# Patient Record
Sex: FEMALE | Race: WHITE | NOT HISPANIC OR LATINO | Employment: UNEMPLOYED | URBAN - METROPOLITAN AREA
[De-identification: names, ages, dates, MRNs, and addresses within clinical notes are randomized per-mention and may not be internally consistent; named-entity substitution may affect disease eponyms.]

---

## 2017-09-20 ENCOUNTER — OFFICE VISIT (OUTPATIENT)
Dept: HEMATOLOGY/ONCOLOGY | Facility: CLINIC | Age: 82
End: 2017-09-20
Payer: MEDICARE

## 2017-09-20 VITALS
RESPIRATION RATE: 18 BRPM | DIASTOLIC BLOOD PRESSURE: 60 MMHG | HEART RATE: 76 BPM | SYSTOLIC BLOOD PRESSURE: 148 MMHG | TEMPERATURE: 98 F

## 2017-09-20 DIAGNOSIS — D63.8 ANEMIA OF OTHER CHRONIC DISEASE: ICD-10-CM

## 2017-09-20 DIAGNOSIS — E07.9 THYROID DISEASE: ICD-10-CM

## 2017-09-20 DIAGNOSIS — Z86.39 HISTORY OF ADDISON'S DISEASE: ICD-10-CM

## 2017-09-20 DIAGNOSIS — D50.0 IRON DEFICIENCY ANEMIA DUE TO CHRONIC BLOOD LOSS: ICD-10-CM

## 2017-09-20 PROCEDURE — 99214 OFFICE O/P EST MOD 30 MIN: CPT | Mod: ,,, | Performed by: INTERNAL MEDICINE

## 2017-09-20 PROCEDURE — 1159F MED LIST DOCD IN RCRD: CPT | Mod: ,,, | Performed by: INTERNAL MEDICINE

## 2017-09-20 RX ORDER — LORAZEPAM 1 MG/1
1 TABLET ORAL NIGHTLY
COMMUNITY

## 2017-09-20 RX ORDER — LEVOTHYROXINE SODIUM 150 UG/1
125 TABLET ORAL
COMMUNITY

## 2017-09-20 RX ORDER — CARVEDILOL 12.5 MG/1
25 TABLET ORAL
COMMUNITY
Start: 2011-11-17 | End: 2018-05-29

## 2017-09-20 RX ORDER — ACETAMINOPHEN 500 MG
TABLET ORAL
COMMUNITY
End: 2018-05-29

## 2017-09-20 RX ORDER — DIPHENHYDRAMINE HCL 25 MG
50 TABLET ORAL NIGHTLY PRN
COMMUNITY

## 2017-09-20 RX ORDER — LOPERAMIDE HCL 1MG/7.5ML
LIQUID (ML) ORAL
COMMUNITY
End: 2018-05-29

## 2017-09-20 RX ORDER — LISINOPRIL 20 MG/1
20 TABLET ORAL
COMMUNITY
Start: 2011-11-17 | End: 2018-05-29

## 2017-09-20 RX ORDER — SOTALOL HYDROCHLORIDE 80 MG/1
80 TABLET ORAL
COMMUNITY
End: 2018-05-29

## 2017-09-20 RX ORDER — PANTOPRAZOLE SODIUM 40 MG/1
40 TABLET, DELAYED RELEASE ORAL
COMMUNITY
End: 2018-05-29

## 2017-09-20 RX ORDER — METOPROLOL TARTRATE 100 MG/1
50 TABLET ORAL
COMMUNITY
Start: 2011-10-28 | End: 2018-05-29

## 2017-09-20 RX ORDER — FLUDROCORTISONE ACETATE 0.1 MG/1
0.1 TABLET ORAL DAILY
COMMUNITY

## 2017-09-20 RX ORDER — DIVALPROEX SODIUM 125 MG/1
250 TABLET, DELAYED RELEASE ORAL DAILY
COMMUNITY

## 2017-09-20 RX ORDER — ACETAMINOPHEN 500 MG
500 TABLET ORAL DAILY PRN
COMMUNITY

## 2017-09-20 RX ORDER — POTASSIUM CHLORIDE 750 MG/1
10 TABLET, EXTENDED RELEASE ORAL
COMMUNITY
End: 2018-05-29

## 2017-09-24 PROBLEM — E07.9 THYROID DISEASE: Status: ACTIVE | Noted: 2017-09-24

## 2017-09-24 PROBLEM — D50.0 IRON DEFICIENCY ANEMIA DUE TO CHRONIC BLOOD LOSS: Status: ACTIVE | Noted: 2017-09-24

## 2017-09-24 PROBLEM — Z86.39 HISTORY OF ADDISON'S DISEASE: Status: ACTIVE | Noted: 2017-09-24

## 2017-09-24 PROBLEM — D63.8 ANEMIA OF OTHER CHRONIC DISEASE: Status: ACTIVE | Noted: 2017-09-24

## 2017-09-25 NOTE — PROGRESS NOTES
[facility] History & Physical    Subjective:      Patient ID:   Sydney Bolton  88 y.o. female  1928  [place]      Chief Complaint:   Anemia follow up    HPI:  88 y.o. 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  Pacemaker?    Hx HTN, DM, GERD, renal insuff, cholesterol, thyroid dx, CVA, TIA, A fib, and restless leg sx.  R breast bx, benign, bladder and uterine prolapse.    Hx T & A, appendectomy, thyroid dx- [id number].  Pacemaker x's 3, 1998.    M0    Allergy Quinine, narcotics, rubarb.  Smoke no, ETOH no, Job  x's 20 years.    Mom colon cancer. Dad aneurism, Sister breast/lung cancer.  Sister lung cancer, kids DM x's 2.    Sister MTHFR, increased plt aggr., F5L +, increased homocystene    ROS:   GEN: normal without any fever, night sweats or weight loss  HEENT: See HPI.   no HA's, sore throat, stiff neck, changes in vision  CV: See HPI.  no CP, SOB, PND, TURNER or orthopnea  PULM: normal with no SOB, cough, hemoptysis, sputum or pleuritic pain  GI: See HPI.  no abdominal pain, nausea, vomiting, constipation, diarrhea, melanotic stools, BRBPR, or hematemesis  : normal with no hematuria, dysuria  BREAST: normal with no mass, discharge, pain  SKIN: normal with no rash, erythema, bruising, or swelling     Past Medical History:   Diagnosis Date    Anemia     Clotting disorder     Thyroid disease      No past surgical history on file.    Review of patient's allergies indicates:   Allergen Reactions    Quinine Anaphylaxis    Levofloxacin      Social History     Social History    Marital status:      Spouse name: N/A    Number of children: N/A    Years of education: N/A     Occupational History    Not on file.     Social History Main Topics    Smoking status: Never Smoker    Smokeless tobacco: Never Used    Alcohol use No    Drug use: Unknown    Sexual activity: Not on file      Other Topics Concern    Not on file     Social History Narrative    No narrative on file         Current Outpatient Prescriptions:     acetaminophen (TYLENOL) 500 MG tablet, Take 500 mg by mouth., Disp: , Rfl:     carvedilol (COREG) 12.5 MG tablet, Take 25 mg by mouth., Disp: , Rfl:     diphenhydrAMINE (SOMINEX) 25 mg tablet, Take 25 mg by mouth., Disp: , Rfl:     iron, carbonyl, (ICAR) 15 mg/1.25 mL Susp, Take by mouth., Disp: , Rfl:     levothyroxine (SYNTHROID) 150 MCG tablet, Take 150 mcg by mouth., Disp: , Rfl:     pantoprazole (PROTONIX) 40 MG tablet, Take 40 mg by mouth., Disp: , Rfl:     sotalol (BETAPACE) 80 MG tablet, Take 80 mg by mouth., Disp: , Rfl:     divalproex (DEPAKOTE) 125 MG EC tablet, Take 125 mg by mouth., Disp: , Rfl:     fludrocortisone (FLORINEF) 0.1 mg Tab, Take 0.1 mg by mouth., Disp: , Rfl:     lisinopril (PRINIVIL,ZESTRIL) 20 MG tablet, Take 20 mg by mouth., Disp: , Rfl:     loperamide (IMODIUM) 1 mg/7.5 mL Liqd, Take by mouth., Disp: , Rfl:     lorazepam (ATIVAN) 1 MG tablet, Take 1 tablet by mouth., Disp: , Rfl:     melatonin 5 mg Tab, Take by mouth., Disp: , Rfl:     metoprolol tartrate (LOPRESSOR) 100 MG tablet, Take 50 mg by mouth., Disp: , Rfl:     potassium chloride (KLOR-CON) 10 MEQ TbSR, Take 10 mEq by mouth., Disp: , Rfl:           Objective:   Vitals:  Blood pressure (!) 148/60, pulse 76, temperature 98 °F (36.7 °C), resp. rate 18.    Physical Examination:   GEN: no apparent distress, comfortable  HEAD: atraumatic and normocephalic  EYES: no pallor, no icterus  ENT:  no pharyngeal erythema, external ears WNL; no nasal discharge; no thrush  NECK: no masses, thyroid normal, trachea midline, no LAD/LN's, supple  CV: RRR with no murmur; normal pulse; normal S1 and S2; no pedal edema  CHEST: Normal respiratory effort; CTAB; normal breath sounds; no wheeze or crackles  ABDOM: nontender and nondistended; soft; normal bowel sounds; no  rebound/guarding  MUSC/Skeletal: ROM normal; no crepitus; joints normal; no deformities or arthropathy  EXTREM: no clubbing, cyanosis, inflammation or swelling  SKIN: no rashes, lesions, ulcers, petechiae or subcutaneous nodules  : no jacobs  NEURO: grossly intact; motor/sensory WNL; no tremors  PSYCH: normal mood, affect and behavior  BREASTS: no palpable mass  LYMPH: normal cervical, supraclavicular, axillary and groin LN's      Labs:    Hgb 12.8.    Assessment/Plan:     (1) 88 y.o. 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  Better.  Observe for now.       RTC, CBC 6 months.    (2)Austin's Dx hx, thyroid dx hx.

## 2018-07-11 ENCOUNTER — OFFICE VISIT (OUTPATIENT)
Dept: NEUROLOGY | Facility: CLINIC | Age: 83
End: 2018-07-11
Payer: MEDICARE

## 2018-07-11 VITALS
SYSTOLIC BLOOD PRESSURE: 146 MMHG | HEIGHT: 68 IN | HEART RATE: 91 BPM | WEIGHT: 127.63 LBS | DIASTOLIC BLOOD PRESSURE: 64 MMHG | RESPIRATION RATE: 20 BRPM | BODY MASS INDEX: 19.34 KG/M2

## 2018-07-11 DIAGNOSIS — R55 SYNCOPE, UNSPECIFIED SYNCOPE TYPE: Primary | ICD-10-CM

## 2018-07-11 DIAGNOSIS — E07.9 THYROID DISEASE: ICD-10-CM

## 2018-07-11 DIAGNOSIS — Z86.39 HISTORY OF ADDISON'S DISEASE: ICD-10-CM

## 2018-07-11 PROCEDURE — 99999 PR PBB SHADOW E&M-EST. PATIENT-LVL III: CPT | Mod: PBBFAC,,, | Performed by: PSYCHIATRY & NEUROLOGY

## 2018-07-11 PROCEDURE — 99205 OFFICE O/P NEW HI 60 MIN: CPT | Mod: S$GLB,,, | Performed by: PSYCHIATRY & NEUROLOGY

## 2018-07-11 NOTE — LETTER
July 12, 2018      Dionicio [place] MD  [address]  [address]  [facility] [address]           [facility]  [address] [facility] [address] [place] [address]  Phone: [phone]  Fax: [phone]          Patient: Sydney Bolton   MR Number: [id number]   YOB: 1928   Date of Visit: 7/11/2018       Dear Dr. Dionicio [place]:    Thank you for referring Sydney Bolton to me for evaluation. Attached you will find relevant portions of my assessment and plan of care.    If you have questions, please do not hesitate to call me. I look forward to following Sydney Bolton along with you.    Sincerely,    Jane Sosa, DO    Enclosure  CC:  No Recipients    If you would like to receive this communication electronically, please contact [email or web address] or [phone] to request more information on [employer] Link access.    For providers and/or their staff who would like to refer a patient to [facility], please contact us through our one-stop-shop provider referral line, [facility], at [phone].    If you feel you have received this communication in error or would no longer like to receive these types of communications, please e-mail [email or web address]

## 2018-07-11 NOTE — PROGRESS NOTES
NEUROLOGY  Outpatient CONSULT    [facility]  [address] [facility] [address], [place] [address]  [phone] (office) / [phone] (fax)    Patient Name:  Sydney Bolton  :  1928  MR #:  [id number]  Acct #:  [id number]    Date of Neurology Consult: 2018  Name of Neurologist: Jane Sosa D.O, ABPN, AOBNP, ABEM    Other Physicians:  Dionicio [place] MD (Primary Care Physician); Dionicio [place] MD (Referring)      Chief Complaint: Fall (consult) and Dizziness      History of Present Illness (HPI):  Sydney Bolton is a 89 y.o. female referred by her primary physician for consultation regarding syncope.  She is accompanied by her daughter today.    Patient starting passing out a long time ago, but it has become more significant in the past year.  She states she has been hospitalized for falls, but she is not sure what happens.  She states it is like someone just turns the light out. She will fall like a tree per observers.  She will often hit her head with these falls. She has had concussions.  She will drop over right in front of people.  Her first episode of loss of consciousness was in the 3rd grade.  She was nauseated from her appendicitis and she passed out.  She would often pass out in gym class.  After she had her children she would often pass out.  She has had multiple evaluations for this and was told that her heart was beating too fast.  She was diagnosed with tachycardia and was seen by cardiology.  She had multiple procedures to try to curb the speed of her heart and finally after an ablation and pacemaker she was doing much better for a long time.   She has also been diagnosed with atrial fibrillation, which is different than her tachycardia.  More recently, she has started having syncope again.  She was seen by her primary who recently diagnosed her with a concussion.    She is only losing consciousness when standing.  It generally occurs after she has been standing  "for a few minutes or more.  She denies any warning that she is going to pass out.  She states she is just "gone".  She will initially feel confused when she wakes up.  She states she has trouble calling on the phone for help initially.  She will sometimes have trouble remembering where she lives.  She doesn't believe she is unconscious very long.  Her daughter states she is only out for a brief moment.  She is described to be stiff as a board when she falls, her legs don't buckle.  She does not throw up but can feel a little nauseated.  She occasionally will urinate with these, but she has never had bowel incontinence.      She has been told at one point that she had "secondary Schuyler's disease".  She was put on salt tablets and Florinef for this.  These helped initially, but she is no longer on the salt tablets.  She just eats salt and takes the Florinef.    She had a tilt table test.  She had a mild reduction in blood pressure, but was just told to drink more water.  She did not take her medications that morning, so she was off Florinef that day.  She typically only takes her Florinef in the morning.  She had her pacemaker interrogated and adjustments made, but her spells continued.  She is not dizzy with these falls.  She can have dizziness, but she states this is mild.  It is not a spinning feeling, she just feels like she has lost her balance.  She was put on Paxil for depression after her  .  It helped with her will to live, her tremors and her choking while eating, but it did not help her syncope.  She saw a sleep neurologist who put her on Depakote for restless sleep.  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  Prior to Depakote she would have episodes of feeling like all the blood had drained out of her and she would pass out. These got better on Depakote.  She has had her dose of Depakote reduced, but the spells haven't changed.  She had been on propranolol for years in the past.       She " has some neuropathy that was diagnosed a couple of years ago by a foot specialist.  She states she feels like she has an ace bandage around her foot and ankle.  She has had a B12 deficiency in the past.  She is not on B12 currently.  She has problems with UTIs, but no kidney disease.  She follows with an endocrinologist for Brett's.    She had labs done with Dr. [place] at [facility] in [place].      Treatment to date:    Annalisa Yoo  Depakote  Propranolol    Review of Systems:   General: Weight gain: No, Weight Loss: Yes, Fatigue: Yes,   Fever: No, Chills: No, Night Sweats: No, Insomnia: No, Excessive sleeping: No   Respiratory:  Cough: Yes, Shortness of Breath: No,   Wheezing: No, Excessive Snoring: No, Coughing up blood: No  Endocrine: Heat Intolerance: No, Cold Intolerance: Yes,   Excessive Thirst: No, Excessive Hunger: No,   Eyes:  Blurred Vision: No, Double Vision: No,   Light Sensitivity: No, Eye pain: No  Musculoskeletal: Muscle Aches/Pain: No, Joint Pain/Swelling: No, Muscle Cramps: No, Muscle Weakness: No, Neck Pain: No, Back Pain: No   Neurological: Difficulty Walking/Falls: No, Headache Migraine: No, Dizziness/Vertigo: Yes, Fainting: Yes, Difficulty with Speech: No, Weakness: Yes, Tingling/Numbness: Yes, Tremors: No, Memory Problems: No, Seizures: No, Difficulty Swallowing: No, Altered Taste: No.  Cardiovascular: Chest Pain: Yes, Shortness of Breath: No,   Palpitations: No,  Gastrointestinal: Nausea/Vomiting: No, Constipation: No, Diarrhea: No, Bloody Stools: No   Psych/Cog:  Depression: Yes, Anxiety: No, Hallucinations: No, Problems Concentrating: No  : Frequent Urination: No, Incontinence: Yes, Blood of Urine: No, Urinary Infections: No, Changes in Sex Drive: No   ENT:Hearing Loss: No, Earache: No, Ringing in Ears: Yes,   Facial Pain: No, Chronic Congestion: No   Immune: Seasonal Allergies: No, Hives and/or Rashes: No  The remainder of the review of twelve body systems was reviewed and  normal.    Past Medical, Surgical, Family & Social History:   Past Medical History:   Diagnosis Date    Anemia     Clotting disorder     Thyroid disease      Past Surgical History:   Procedure Laterality Date    APPENDECTOMY      CARDIAC PACEMAKER PLACEMENT      left arm surgery       had plate put in due to a break    THYROIDECTOMY      TONSILLECTOMY       Family History   Problem Relation Age of Onset    Colon cancer Mother     Anemia Father     Breast cancer Sister     Lung cancer Sister      Alcohol use:  reports that she does not drink alcohol.   (Of note, 0.6 oz = 1 beer or 6 oz = 10 beers).  Tobacco use:  reports that she has never smoked. She has never used smokeless tobacco.  Street drug use:  reports that she does not use drugs.  Allergies: Quinine; Levofloxacin; and Rhubarb.    Home Medications:     Current Outpatient Prescriptions:     apixaban (ELIQUIS) 5 mg Tab, Take 5 mg by mouth 2 (two) times daily., Disp: , Rfl:     diphenhydrAMINE (SOMINEX) 25 mg tablet, Take 50 mg by mouth nightly as needed for Insomnia. , Disp: , Rfl:     diphenoxylate-atropine 2.5-0.025 mg (LOMOTIL) 2.5-0.025 mg per tablet, Take 1 tablet by mouth 4 (four) times daily as needed for Diarrhea., Disp: , Rfl:     divalproex (DEPAKOTE) 125 MG EC tablet, Take 250 mg by mouth once daily. , Disp: , Rfl:     fludrocortisone (FLORINEF) 0.1 mg Tab, Take 0.1 mg by mouth once daily. , Disp: , Rfl:     levothyroxine (SYNTHROID) 150 MCG tablet, Take 125 mcg by mouth before breakfast. , Disp: , Rfl:     lisinopril (PRINIVIL,ZESTRIL) 5 MG tablet, Take 5 mg by mouth once daily., Disp: , Rfl:     lorazepam (ATIVAN) 1 MG tablet, Take 1 tablet by mouth every evening. , Disp: , Rfl:     omeprazole (PRILOSEC) 20 MG capsule, Take 20 mg by mouth once daily., Disp: , Rfl:     paroxetine (PAXIL) 10 MG tablet, Take 10 mg by mouth every morning., Disp: , Rfl:     promethazine (PHENERGAN) 12.5 MG Tab, Take 12.5 mg by mouth every 6 (six)  "hours as needed (nausea). , Disp: , Rfl:     spironolactone (ALDACTONE) 25 MG tablet, Take 25 mg by mouth once daily., Disp: , Rfl:     acetaminophen (TYLENOL) 500 MG tablet, Take 500 mg by mouth daily as needed for Pain. , Disp: , Rfl:     guaiFENesin (MUCINEX) 600 mg 12 hr tablet, Take 1,200 mg by mouth 2 (two) times daily as needed for Congestion., Disp: , Rfl:     magnesium chloride (MAG 64 ORAL), Take by mouth., Disp: , Rfl:     Current Facility-Administered Medications:     nitroglycerin (NITROMIST) 400 mcg/spray 1 spray, 1 spray, Sublingual, On Call Procedure, Devaughn Rodriguez III, MD    Physical Examination:  BP (!) 146/64   Pulse 91   Resp 20   Ht 5' 8" (1.727 m)   Wt 57.9 kg (127 lb 9.6 oz)   BMI 19.40 kg/m²     GENERAL:  General appearance: Well, non-toxic appearing.  No apparent distress.  Fundi exam: normal.  Neck: supple.  Carotid auscultation: normal.  Heart auscultation: normal.  Peripheral pulses: normal.  Extremities: normal.    MENTAL STATUS:  Alertness, attention span & concentration: normal.  Language: normal.  Orientation to self, place & time:  normal.  Memory, recent & remote: normal.  Fund of knowledge: normal.    SPEECH:  Clear and fluent.  Follows complex commands.    CRANIAL NERVES:  Cranial Nerves II-XII were examined.  II - Visual fields: normal.  III, IV, VI: PERRL, EOMI, No ptosis, No nystagmus.  V - Facial sensation: normal.  VII - Face symmetry & mobility: normal.  VIII - Hearing: normal.  IX, X - Palate: mobile & midline.  XI - Shoulder shrug: normal.  XII - Tongue protrusion: normal.    GROSS MOTOR:  Gait & station: normal.  Tone: normal.  Abnormal movements: terminal tremor.  Finger-nose & Heel-knee-shin: normal.  Rapid alternating movements & drift: normal.  Romberg: absent    MUSCLE STRENGTH:     Fascics Atrophy RIGHT    LEFT Atrophy Fascics     5 Neck Ext. 5       5 Neck Flex 5       5 Deltoids 5       5 Sh.Ext.Rot. 5       5 Sh.Int.Rot. 5       5 Biceps 5       5 " Triceps 5       5 Forearm.Pr. 5       5 Wrist Ext. 5       5 Wrist Flex 5       5 Finger Ext. 5       5 Finger Flex 5       5 FPL 5       5 Inteross. 5                         5 Iliopsoas 5       5 Hip Abduct 5       5 Hip Adduct 5       5 Quads 5       5 Hams 5       5 Dorsiflex 5       5 Plantar Flex 5       5 Ankle Reuben 5       5 Ankle Invert 5       5 Toe Ext. 5       5 Toe Flex 5                         REFLEXES:    RIGHT Reflex   LEFT   2 Biceps 2   2 Brachiorad. 2   2 Triceps 2        2 Patellar 2   0 Ankle 0        Down PLANTAR Down     SENSORY:  Light touch: reduced sensation in the feet and ankles.  Vibration: absent at ankles and toes.      Diagnostic Data Reviewed:   Records from her primary provider were reviewed.  She was seen after her fall.  She was complaining of some dizziness at the time.  She was felt to have suffered a concussion.    Tilt table reported a drop in pressure, but not significant.  She has BP as high as 220.  She was advised to drink more fluids.    She was hospitalized in June for syncope and was transferred to another hospital for an endocrine consultation for addisons.  She was not felt to have addisons based on this consultation.    Assessment and Plan:  Sydney Bolton is a 89 y.o., right handed woman with a history of anticoagulation, thyroid disease and anemia as well as a possible diagnosis if [place]'s who has been having episodes of syncope since childhood.  Her early description sounds consistent with vasodepressor syncope or vagal syncope.  She then reports dizziness with syncope that could be from dysautonomia, but she has no other symptoms of autonomic dysfunction.  In addition, she only has a mild neuropathy, not typical of that associated with dysautonomia.  Her current episodes of syncope sound very cardiac, but she has reportedly had her pacemaker interrogated and nothing concerning was found.  She also had a cardiac echo that did not reveal significant issues.   This is an atypical presentation of seizure, but it would be reasonable to look at this.  There is no indication for imaging at this point.    Will order an EEG.  Will request blood work from [facility] in [place].      She was noted to have essential tremor on exam, but no treatment is advised at this time.  She is tolerating the tremor and adding medication might result in further problems with falls.    The patient will return to clinic in 1 months.    Important to note, also  has a past medical history of Anemia; Clotting disorder; and Thyroid disease.      Jane Sosa D.O, ABPN, AOBNP, ABEM    This note was created with voice recognition software.  Grammatical, syntax and spelling errors may be inevitable.

## 2018-07-11 NOTE — PATIENT INSTRUCTIONS
Will plan for an EEG to see if there is any evidence of abnormal electrical activity in the brain that may be contributing to your episodes of passing out.    Will request a copy of your labs from Dr. [place]'s office to review.

## 2018-07-19 ENCOUNTER — HOSPITAL ENCOUNTER (OUTPATIENT)
Dept: NEUROLOGY | Facility: HOSPITAL | Age: 83
Discharge: HOME | End: 2018-07-19
Attending: PSYCHIATRY & NEUROLOGY
Payer: MEDICARE

## 2018-07-19 DIAGNOSIS — R55 SYNCOPE, UNSPECIFIED SYNCOPE TYPE: ICD-10-CM

## 2018-07-19 PROCEDURE — 95816 EEG AWAKE AND DROWSY: CPT | Mod: 26,,, | Performed by: PSYCHIATRY & NEUROLOGY

## 2018-07-19 PROCEDURE — 95816 EEG AWAKE AND DROWSY: CPT

## 2018-08-07 NOTE — PROCEDURES
EEG,w/awake & asleep record  Date/Time: 8/7/2018 4:22 PM  Performed by: ELVIRA BRYANT  Authorized by: ANTONY LYNN         ROUTINE ELECTROENCEPHALOGRAM REPORT      Sydney Bolton  [id number]  11/30/1928    DATE OF SERVICE: 7/19/18    REQUESTING PROVIDER: Antony Lynn DO    REASON FOR CONSULT: 89yo F with hx of syncope    PRIOR EEG: none    MEDICATIONS:   Current Outpatient Prescriptions   Medication Sig    acetaminophen (TYLENOL) 500 MG tablet Take 500 mg by mouth daily as needed for Pain.     apixaban (ELIQUIS) 5 mg Tab Take 5 mg by mouth 2 (two) times daily.    diphenhydrAMINE (SOMINEX) 25 mg tablet Take 50 mg by mouth nightly as needed for Insomnia.     diphenoxylate-atropine 2.5-0.025 mg (LOMOTIL) 2.5-0.025 mg per tablet Take 1 tablet by mouth 4 (four) times daily as needed for Diarrhea.    divalproex (DEPAKOTE) 125 MG EC tablet Take 250 mg by mouth once daily.     fludrocortisone (FLORINEF) 0.1 mg Tab Take 0.1 mg by mouth once daily.     guaiFENesin (MUCINEX) 600 mg 12 hr tablet Take 1,200 mg by mouth 2 (two) times daily as needed for Congestion.    levothyroxine (SYNTHROID) 150 MCG tablet Take 125 mcg by mouth before breakfast.     lisinopril (PRINIVIL,ZESTRIL) 5 MG tablet Take 5 mg by mouth once daily.    lorazepam (ATIVAN) 1 MG tablet Take 1 tablet by mouth every evening.     magnesium chloride (MAG 64 ORAL) Take by mouth.    omeprazole (PRILOSEC) 20 MG capsule Take 20 mg by mouth once daily.    paroxetine (PAXIL) 10 MG tablet Take 10 mg by mouth every morning.    promethazine (PHENERGAN) 12.5 MG Tab Take 12.5 mg by mouth every 6 (six) hours as needed (nausea).     spironolactone (ALDACTONE) 25 MG tablet Take 25 mg by mouth once daily.     Current Facility-Administered Medications   Medication    nitroglycerin (NITROMIST) 400 mcg/spray 1 spray     METHODOLOGY   Electroencephalographic (EEG) recording is with electrodes placed according to the International 10-20 placement  system.  Thirty two (32) channels of digital signal (sampling rate of 512/sec) including T1 and T2 was simultaneously recorded from the scalp and may include  EKG, EMG, and/or eye monitors.  Recording band pass was 0.1 to 512 hz.  Digital video recording of the patient is simultaneously recorded with the EEG.  The patient is instructed report clinical symptoms which may occur during the recording session.  EEG and video recording is stored and archived in digital format. Activation procedures which include photic stimulation, hyperventilation and instructing patients to perform simple task are done in selected patients.    The EEG is displayed on a monitor screen and can be reviewed using different montages.  Computer assisted analysis is employed to detect spike and electrographic seizure activity.   The entire record is submitted for computer analysis.  The entire recording is visually reviewed and the times identified by computer analysis as being spikes or seizures are reviewed again.  Compresses spectral analysis (CSA) is also performed on the activity recorded from each individual channel.  This is displayed as a power display of frequencies from 0 to 30 Hz over time.   The CSA is reviewed looking for asymmetries in power between homologous areas of the scalp and then compared with the original EEG recording.     Playroom software was also utilized in the review of this study.  This software suite analyzes the EEG recording in multiple domains.  Coherence and rhythmicity is computed to identify EEG sections which may contain organized seizures.  Each channel undergoes analysis to detect presence of spike and sharp waves which have special and morphological characteristic of epileptic activity.  The routine EEG recording is converted from spacial into frequency domain.  This is then displayed comparing homologous areas to identify areas of significant asymmetry.  Algorithm to identify non-cortically generated  artifact is used to separate eye movement, EMG and other artifact from the EEG    EEG FINGINGS  Background activity:   The background rhythm was characterized by alpha (8-9 Hz) activity with a 9 Hz posterior dominant alpha rhythm at 30-70 microvolts.   Symmetry and continuity: The background was continuous and symmetric    Sleep:   Brief normal drowsiness seen.    Activation procedures:   Photic stimulation was performed with no abnormalities seen  Hyperventilation was not performed    Abnormal activity:   No epileptiform discharges, periodic discharges, lateralized rhythmic delta activity or electrographic seizures were seen.    IMPRESSION:   This is a normal routine EEG done in awake and drowsy states.    A normal EEG does not rule out seizures/epilepsy.  CLINICAL CORRELATION IS RECOMMENDED.    Nova Phillip MD, CARL(), RONNI, DAT.  Neurology-Epilepsy.  [facility].

## 2018-08-17 NOTE — PROGRESS NOTES
The EEG results are normal.  She should work with cardiology to see if she has vasodepressor syncope.  A tilt table test is advised.  The patient was notified by letter.